# Patient Record
Sex: MALE | Race: OTHER
[De-identification: names, ages, dates, MRNs, and addresses within clinical notes are randomized per-mention and may not be internally consistent; named-entity substitution may affect disease eponyms.]

---

## 2017-11-03 ENCOUNTER — HOSPITAL ENCOUNTER (OUTPATIENT)
Dept: HOSPITAL 47 - ORWHC2ENDO | Age: 47
Discharge: HOME | End: 2017-11-03
Attending: SURGERY
Payer: COMMERCIAL

## 2017-11-03 VITALS — BODY MASS INDEX: 29.2 KG/M2

## 2017-11-03 VITALS — DIASTOLIC BLOOD PRESSURE: 69 MMHG | HEART RATE: 62 BPM | SYSTOLIC BLOOD PRESSURE: 103 MMHG

## 2017-11-03 VITALS — RESPIRATION RATE: 18 BRPM

## 2017-11-03 VITALS — TEMPERATURE: 97 F

## 2017-11-03 DIAGNOSIS — K64.8: Primary | ICD-10-CM

## 2017-11-03 PROCEDURE — 45378 DIAGNOSTIC COLONOSCOPY: CPT
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
## 2017-11-03 NOTE — P.OP
Date of Procedure: 11/03/17


Preoperative Diagnosis: 


GI bleed





Hemorrhoids


Postoperative Diagnosis: 


Internal hemorrhoids


Procedure(s) Performed: 


Colonoscopy


Anesthesia: MAC


Surgeon: Yogesh Ramirez


Pathology: none sent


Condition: stable


Disposition: PACU


Description of Procedure: 


The patient's placed on the endoscopy table in the lateral position.  He 

received IV sedation.  Digital rectal exam was performed which revealed 

internal hemorrhoids.  Flexible colonoscope was then placed patient anus and 

passed throughout the entire colon.  The ileocecal valve was visualized.  The 

cecum, ascending and transverse colon appeared normal.  The scope was then 

brought back into the descending and sigmoid colon and this was normal.  Scope 

was then brought back the rectum and this appeared normal.  The scope was then 

retroflexed and internal hemorrhoids were noted.  Scope was withdrawn through 

the anus.  No significant external hemorrhoids were seen.

## 2019-10-01 ENCOUNTER — HOSPITAL ENCOUNTER (OUTPATIENT)
Dept: HOSPITAL 47 - RADMRIMAIN | Age: 49
Discharge: HOME | End: 2019-10-01
Attending: ORTHOPAEDIC SURGERY
Payer: COMMERCIAL

## 2019-10-01 DIAGNOSIS — S83.412A: ICD-10-CM

## 2019-10-01 DIAGNOSIS — S83.242A: Primary | ICD-10-CM

## 2019-10-01 DIAGNOSIS — M17.12: ICD-10-CM
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
## 2020-07-29 NOTE — HP
HISTORY AND PHYSICAL



DATE OF SURGERY:

07/30/2020



Josette Domínguez is a 50-year-old patient seen with progressive left knee pain.  We

discussed options for treatment.  He elected to proceed with arthroscopy.  Consent was

obtained.



PAST MEDICAL HISTORY:

Noncontributory.



PAST SURGICAL HISTORY:

Noncontributory.



DAILY MEDICATIONS:

None.



ALLERGIES:

None.



SOCIAL HISTORY:

Denies tobacco use.



PHYSICAL EVALUATION OF THE LEFT KNEE:

Range of motion 0-125.  Moderate effusion.  Tenderness medial joint line.  Positive

medial Kusum's.  Ligaments stable.  Hip rotation without pain.  Distal neurovascular

exam is intact.



RADIOGRAPHS OF THE LEFT KNEE:

Reveal moderate medial compartment osteoarthritis.



MRI of the left knee revealed medial meniscal tear and osteoarthritic changes.



IMPRESSION:

1. Internal derangement, left knee with medial meniscal tear.

2. Left knee osteoarthritis.



PLAN:

Left knee arthroscopy with partial meniscectomy, partial synovectomy and debridement.





MMODL / IJN: [id number] / Job#: [id number]

## 2020-07-30 ENCOUNTER — HOSPITAL ENCOUNTER (OUTPATIENT)
Dept: HOSPITAL 47 - OR | Age: 50
Discharge: HOME | End: 2020-07-30
Attending: ORTHOPAEDIC SURGERY
Payer: COMMERCIAL

## 2020-07-30 VITALS — RESPIRATION RATE: 16 BRPM

## 2020-07-30 VITALS — SYSTOLIC BLOOD PRESSURE: 117 MMHG | DIASTOLIC BLOOD PRESSURE: 80 MMHG | HEART RATE: 60 BPM

## 2020-07-30 VITALS — TEMPERATURE: 96.7 F

## 2020-07-30 VITALS — BODY MASS INDEX: 28 KG/M2

## 2020-07-30 DIAGNOSIS — M23.222: Primary | ICD-10-CM

## 2020-07-30 DIAGNOSIS — M17.12: ICD-10-CM

## 2020-07-30 DIAGNOSIS — M65.862: ICD-10-CM

## 2020-07-30 DIAGNOSIS — M94.262: ICD-10-CM

## 2020-07-30 PROCEDURE — 29881 ARTHRS KNE SRG MNISECTMY M/L: CPT

## 2020-07-30 RX ADMIN — HYDROMORPHONE HYDROCHLORIDE PRN MG: 1 INJECTION, SOLUTION INTRAMUSCULAR; INTRAVENOUS; SUBCUTANEOUS at 10:53

## 2020-07-30 RX ADMIN — HYDROMORPHONE HYDROCHLORIDE PRN MG: 1 INJECTION, SOLUTION INTRAMUSCULAR; INTRAVENOUS; SUBCUTANEOUS at 10:48

## 2020-07-30 NOTE — P.OP
Date of Procedure: 07/30/20


Preoperative Diagnosis: 


Internal derangement left knee


Postoperative Diagnosis: 


1.  Tear medial meniscus left knee


2.  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


3.  Reactive synovitis medial, lateral and suprapatellar compartments left knee





Procedure(s) Performed: 


1.  Arthroscopic partial medial meniscectomy left knee


2.  Arthroscopic chondroplasty medial femoral condyle left knee


3.  Arthroscopic partial synovectomy medial, lateral and suprapatellar 

compartments left knee





Anesthesia: GETA, local


Surgeon: Darnell Rees


Estimated Blood Loss (ml): 7


Pathology: none sent


Condition: stable


Disposition: PACU


Indications for Procedure: 


50-year-old patient seen with progressive left knee pain.  After having 

treatment options discussed, he elected to proceed with arthroscopy.


Operative Findings: 


See description of procedure


Description of Procedure: 


Patient was taken to the operative suite.  Patient underwent a general 

anesthetic by the department of anesthesia.  Patient was given preoperative 

antibiotics.  The left lower extremity was placed in a well-padded arthroscopic 

leg garcia.  The left leg was prepped and draped in the normal sterile 

orthopedic fashion.  A lateral parapatellar and suprapatellar incision was made.

 Trochars were inserted.  Arthroscopy was initiated.  Suprapatellar pouch 

revealed diffuse thick reactive synovitis.  The patellofemoral joint appeared to

articulate congruently.  There was grade 1 chondromalacia of the patellofemoral 

joint with no osteochondral tears present.  The scope was guided into the medial

gutter.  Loose bodies or plica were identified.  The scope was then guided into 

the medial compartment.  A medial parapatellar incision was made.  Trocar 

inserted followed by probe.  There was a complex tear involving the posterior 

horn medial meniscus which should extend into the midbody area.  There were 

grade 2 chondral malacia changes the medial femoral condyle with some 

osteochondral flap tears present.  There was an area of grade 4 chondromalacia 

along the medial aspect medial tibial plateau with exposed bone measuring 1 cm. 

There was thick reactive synovitis anteriorly.  I performed a partial medial 

meniscectomy.  I performed a chondroplasty of the medial femoral condyle.  I 

performed a partial synovectomy.  The residual meniscus was stable.  The 

residual osteochondral surface was stable.  There was good decompression of 

synovitis.  Scope and probe were then guided into the intercondylar notch.  

Cruciates were identified, probed and found to be stable.  The scope and probe 

were then guided into lateral compartment.  Lateral meniscus was probed and 

found to be stable.  There was some reactive synovitis anteriorly.  There was no

chondromalacia.  I introduced a motorized shaver and performed a partial 

synovectomy decompressing the thick reactive synovitis.  There was good 

decompression of synovitis.  The scope was in guided back into the suprapatellar

compartment.  I introduced a motorized shaver into the suprapatellar compartment

.  I debrided some piecemeal fragments of meniscus I encountered.  I performed a

partial synovectomy.  Shaver was removed.  There was good decompression of the 

synovitis.  I took one more look on the entire knee, no residual debris.  

Instruments were now removed from the joint.  The joint was infiltrated with 

.25% Marcaine.  Steri-Strips were applied to the portal sites.  Sterile 

dressings were applied.  The patient was placed into a JOSE hose.  No tourniquet 

was utilized.  The patient was awakened, transferred to a bed and taken to 

recovery stable satisfactory condition.

## 2022-06-02 ENCOUNTER — HOSPITAL ENCOUNTER (OUTPATIENT)
Dept: HOSPITAL 47 - ORWHC2ENDO | Age: 52
Discharge: HOME | End: 2022-06-02
Attending: SURGERY
Payer: COMMERCIAL

## 2022-06-02 VITALS — SYSTOLIC BLOOD PRESSURE: 105 MMHG | HEART RATE: 59 BPM | DIASTOLIC BLOOD PRESSURE: 68 MMHG

## 2022-06-02 VITALS — TEMPERATURE: 97.7 F

## 2022-06-02 VITALS — RESPIRATION RATE: 16 BRPM

## 2022-06-02 VITALS — BODY MASS INDEX: 28.3 KG/M2

## 2022-06-02 DIAGNOSIS — K64.4: Primary | ICD-10-CM

## 2022-06-02 DIAGNOSIS — K64.8: ICD-10-CM

## 2022-06-02 DIAGNOSIS — M19.90: ICD-10-CM

## 2022-06-02 PROCEDURE — 45378 DIAGNOSTIC COLONOSCOPY: CPT
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
## 2022-06-02 NOTE — P.OP
Date of Procedure: 06/02/22


Preoperative Diagnosis: 


GI bleed


Postoperative Diagnosis: 


Internal and external hemorrhoids


Procedure(s) Performed: 


Colonoscopy


Anesthesia: MAC


Surgeon: Yogesh Ramirez


Pathology: none sent


Condition: stable


Disposition: PACU


Description of Procedure: 


Patient's placed on the endoscopy table in the lateral position.  He received IV

sedation.  Digital rectal exam was performed.  This revealed internal and 

external hemorrhoids.  Flexible colonoscope was then placed patient anus passed 

throughout the entire colon.  The ileocecal valve was visualized.  Cecum, 

ascending and transverse colon appeared normal.  The descending and sigmoid 

colon appeared normal.  The scope was then brought back and the rectum and this 

appeared normal.  Scope was withdrawn through the anus and internal and external

hemorrhoids were noted.  The colonoscope was then removed.





Presumed patient's bleeding may have been from from hemorrhoids

## 2022-07-11 ENCOUNTER — HOSPITAL ENCOUNTER (OUTPATIENT)
Dept: HOSPITAL 47 - OR | Age: 52
Discharge: HOME | End: 2022-07-11
Attending: SURGERY
Payer: COMMERCIAL

## 2022-07-11 VITALS — RESPIRATION RATE: 17 BRPM

## 2022-07-11 VITALS — BODY MASS INDEX: 28.2 KG/M2

## 2022-07-11 VITALS — DIASTOLIC BLOOD PRESSURE: 68 MMHG | HEART RATE: 62 BPM | SYSTOLIC BLOOD PRESSURE: 106 MMHG

## 2022-07-11 VITALS — TEMPERATURE: 97.5 F

## 2022-07-11 DIAGNOSIS — N42.9: ICD-10-CM

## 2022-07-11 DIAGNOSIS — K64.8: Primary | ICD-10-CM

## 2022-07-11 DIAGNOSIS — K64.4: ICD-10-CM

## 2022-07-11 DIAGNOSIS — M19.90: ICD-10-CM

## 2022-07-11 PROCEDURE — 88304 TISSUE EXAM BY PATHOLOGIST: CPT

## 2022-07-11 PROCEDURE — 46260 REMOVE IN/EX HEM GROUPS 2+: CPT
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
## 2022-07-11 NOTE — P.OP
Date of Procedure: 07/11/22


Preoperative Diagnosis: 


Internal and external hemorrhoids


Postoperative Diagnosis: 


Internal and external hemorrhoids


Procedure(s) Performed: 


Internal and external hemorrhoidectomy


Anesthesia: KUNAL


Surgeon: Yogesh Ramirez


Estimated Blood Loss (ml): 5


Pathology: other (Internal and external hemorrhoids)


Condition: stable


Disposition: PACU


Description of Procedure: 


The patient's placed on the operating table in the prone position after 

receiving general anesthetic.  His anus prepped draped usual fashion.  The anal 

retractors placed anus.  The left lateral hemorrhoidal column was seen.  This is

grasped with a Lulu clamp.  Using the Harmonic scissors the hemorrhoid was 

performed.  Next the right anterior and right posterior hemorrhoidal columns 

were dissected free in identical fashion.  The specimens of pathology.  There is

no bleeding seen.  A piece of Gelfoam was placed and anus.  Patient top she will

was sent to recovery room in stable condition.